# Patient Record
Sex: FEMALE | NOT HISPANIC OR LATINO | Employment: OTHER | URBAN - NONMETROPOLITAN AREA
[De-identification: names, ages, dates, MRNs, and addresses within clinical notes are randomized per-mention and may not be internally consistent; named-entity substitution may affect disease eponyms.]

---

## 2019-11-26 LAB
LEFT EYE DIABETIC RETINOPATHY: NORMAL
RIGHT EYE DIABETIC RETINOPATHY: NORMAL

## 2020-01-17 ENCOUNTER — OFFICE VISIT (OUTPATIENT)
Dept: OTOLARYNGOLOGY | Facility: CLINIC | Age: 65
End: 2020-01-17
Payer: OTHER GOVERNMENT

## 2020-01-17 VITALS
OXYGEN SATURATION: 98 % | HEIGHT: 64 IN | WEIGHT: 176 LBS | SYSTOLIC BLOOD PRESSURE: 122 MMHG | DIASTOLIC BLOOD PRESSURE: 80 MMHG | HEART RATE: 56 BPM | BODY MASS INDEX: 30.05 KG/M2

## 2020-01-17 DIAGNOSIS — H90.3 BILATERAL SENSORINEURAL HEARING LOSS: ICD-10-CM

## 2020-01-17 DIAGNOSIS — L29.9 EAR ITCHING: Primary | ICD-10-CM

## 2020-01-17 PROCEDURE — 99242 OFF/OP CONSLTJ NEW/EST SF 20: CPT | Performed by: OTOLARYNGOLOGY

## 2020-01-17 RX ORDER — PANTOPRAZOLE SODIUM 40 MG/1
40 TABLET, DELAYED RELEASE ORAL EVERY MORNING
COMMUNITY
Start: 2019-12-29

## 2020-01-17 RX ORDER — OLMESARTAN MEDOXOMIL 20 MG/1
TABLET, FILM COATED ORAL
COMMUNITY
Start: 2020-01-08

## 2020-01-17 RX ORDER — ASPIRIN 81 MG/1
81 TABLET ORAL DAILY
COMMUNITY

## 2020-01-17 NOTE — PROGRESS NOTES
Consultation - Otolaryngology - Head and Neck Surgery  Facial Plastic and Reconstructive Surgery  Diane Carlynn Apley 62 y o  female MRN: [id number]  Encounter: [id number]        Assessment/Plan:  1  Ear itching     2  Bilateral sensorineural hearing loss         We discussed that she does not have a foreign body in her ear  It likely came out overnight while she was sleeping  She will continue to follow up with her normal audiology providers and return to our office on an as needed basis  History of Present Illness   Physician Requesting Consult: Arlene Johnson MD  Reason for Consult / Principal Problem:  Ear foreign body  HPI: Manuel Wilkinson is a 62y o  year old female who presents with ear foreign body  She notes that she lost the dome from her new hearing aid into her ear  It caused her significant itching and discomfort  This has been going over the past 2 days and was referred to our office for evaluation and management  She thinks that it may have fallen out last night while she was sleeping on her right side  She denies any further otalgia  No ear drainage  She has a history of bilateral sensorineural hearing loss  She got her hearing aids from "[employer]  She denies any nasal obstruction  She denies any sleep apnea  No sore throats  No family history of hearing loss  No sudden hearing change  Review of systems:  10 Point ROS was performed and negative except as above or otherwise noted in the medical record  Historical Information   History reviewed  No pertinent past medical history  History reviewed  No pertinent surgical history  Social History   Social History     Substance and Sexual Activity   Alcohol Use Not on file     Social History     Substance and Sexual Activity   Drug Use Not on file     Social History     Tobacco Use   Smoking Status Never Smoker   Smokeless Tobacco Never Used     Family History: History reviewed  No pertinent family history      Current Outpatient Medications on File Prior to Visit   Medication Sig    aspirin (ECOTRIN LOW STRENGTH) 81 mg EC tablet Take 81 mg by mouth daily    BENICAR 20 MG tablet     metoprolol tartrate (LOPRESSOR) 25 mg tablet     pantoprazole (PROTONIX) 40 mg tablet Take 40 mg by mouth every morning     No current facility-administered medications on file prior to visit  Allergies   Allergen Reactions    Latex     Medical Tape        Vitals:    01/17/20 1242   BP: 122/80   Pulse: 56   SpO2: 98%       Physical Exam   Constitutional: Oriented to person, place, and time  Well-developed and well-nourished, no apparent distress, non-toxic appearance  Cooperative, able to hear and answer questions without difficulty  Voice: Normal voice quality  Head: Normocephalic, atraumatic  No scars, masses or lesions  Face: Symmetric, no edema, no sinus tenderness  Eyes: Vision grossly intact, extra-ocular movement intact  Ears: External ears normal  Tympanic membranes intact with intact normal landmarks  No post-auricular erythema or tenderness  Nose: Septum intact, nares clear  Mucosa moist, turbinates well appearing  No crusting, polyps or discharge evident  Oral cavity: Dentition intact  Mucosa moist, lips without lesions or masses  Tongue mobile, floor of mouth soft and flat  Hard palate intact  No masses or lesions  Oropharynx: Uvula is midline, soft palate intact without lesion or mass  Oropharyngeal inlet without obstruction  Tonsils unremarkable  Posterior pharyngeal wall clear  No masses or lesions  Salivary glands:  Parotid glands and submandibular glands symmetric, no enlargement or tenderness  Neck: Normal laryngeal elevation with swallow  Trachea midline  No masses or lesions  No palpable adenopathy  Thyroid: Without tenderness or palpable nodules  Pulmonary/Chest: Normal effort and rate  No respiratory distress  No stertor or stridor  Musculoskeletal: Normal range of motion     Neurological: Cranial nerves 2-12 intact  Skin: Skin is warm and dry  Psychiatric: Normal mood and affect  Imaging Studies: I have personally reviewed pertinent reports  Lab Results: I have personally reviewed pertinent lab results  Greater than 30 minutes were spent in consultation  More than 1/2 of that time was spent in counselling and coordination of care